# Patient Record
Sex: FEMALE | Race: WHITE | NOT HISPANIC OR LATINO | URBAN - METROPOLITAN AREA
[De-identification: names, ages, dates, MRNs, and addresses within clinical notes are randomized per-mention and may not be internally consistent; named-entity substitution may affect disease eponyms.]

---

## 2017-07-15 ENCOUNTER — HOSPITAL ENCOUNTER (EMERGENCY)
Facility: CLINIC | Age: 27
Discharge: HOME | End: 2017-07-15
Attending: EMERGENCY MEDICINE | Admitting: EMERGENCY MEDICINE
Payer: COMMERCIAL

## 2017-07-15 VITALS
TEMPERATURE: 98.6 F | HEART RATE: 78 BPM | OXYGEN SATURATION: 90 % | DIASTOLIC BLOOD PRESSURE: 78 MMHG | SYSTOLIC BLOOD PRESSURE: 110 MMHG | RESPIRATION RATE: 17 BRPM

## 2017-07-15 DIAGNOSIS — F10.930 ALCOHOL WITHDRAWAL SEIZURE, UNCOMPLICATED (H): ICD-10-CM

## 2017-07-15 DIAGNOSIS — F10.239 ALCOHOL DEPENDENCE WITH WITHDRAWAL WITH COMPLICATION (H): ICD-10-CM

## 2017-07-15 DIAGNOSIS — R79.89 ABNORMAL LFTS: ICD-10-CM

## 2017-07-15 DIAGNOSIS — R56.9 ALCOHOL WITHDRAWAL SEIZURE, UNCOMPLICATED (H): ICD-10-CM

## 2017-07-15 LAB
ALBUMIN SERPL-MCNC: 3.4 G/DL (ref 3.4–5)
ALP SERPL-CCNC: 143 U/L (ref 40–150)
ALT SERPL W P-5'-P-CCNC: 245 U/L (ref 0–50)
ANION GAP SERPL CALCULATED.3IONS-SCNC: 11 MMOL/L (ref 3–14)
AST SERPL W P-5'-P-CCNC: 398 U/L (ref 0–45)
BASOPHILS # BLD AUTO: 0 10E9/L (ref 0–0.2)
BASOPHILS NFR BLD AUTO: 0.9 %
BILIRUB SERPL-MCNC: 0.9 MG/DL (ref 0.2–1.3)
BUN SERPL-MCNC: 6 MG/DL (ref 7–30)
CALCIUM SERPL-MCNC: 8.9 MG/DL (ref 8.5–10.1)
CHLORIDE SERPL-SCNC: 101 MMOL/L (ref 94–109)
CO2 SERPL-SCNC: 26 MMOL/L (ref 20–32)
CREAT SERPL-MCNC: 0.47 MG/DL (ref 0.52–1.04)
DIFFERENTIAL METHOD BLD: ABNORMAL
EOSINOPHIL # BLD AUTO: 0 10E9/L (ref 0–0.7)
EOSINOPHIL NFR BLD AUTO: 0.9 %
ERYTHROCYTE [DISTWIDTH] IN BLOOD BY AUTOMATED COUNT: 13.6 % (ref 10–15)
ETHANOL SERPL-MCNC: 0.07 G/DL
GFR SERPL CREATININE-BSD FRML MDRD: ABNORMAL ML/MIN/1.7M2
GLUCOSE SERPL-MCNC: 89 MG/DL (ref 70–99)
HCG SERPL QL: NEGATIVE
HCT VFR BLD AUTO: 32.3 % (ref 35–47)
HGB BLD-MCNC: 10.9 G/DL (ref 11.7–15.7)
IMM GRANULOCYTES # BLD: 0 10E9/L (ref 0–0.4)
IMM GRANULOCYTES NFR BLD: 0.4 %
LYMPHOCYTES # BLD AUTO: 0.7 10E9/L (ref 0.8–5.3)
LYMPHOCYTES NFR BLD AUTO: 15.5 %
MCH RBC QN AUTO: 36.5 PG (ref 26.5–33)
MCHC RBC AUTO-ENTMCNC: 33.7 G/DL (ref 31.5–36.5)
MCV RBC AUTO: 108 FL (ref 78–100)
MONOCYTES # BLD AUTO: 0.4 10E9/L (ref 0–1.3)
MONOCYTES NFR BLD AUTO: 8.4 %
NEUTROPHILS # BLD AUTO: 3.3 10E9/L (ref 1.6–8.3)
NEUTROPHILS NFR BLD AUTO: 73.9 %
NRBC # BLD AUTO: 0 10*3/UL
NRBC BLD AUTO-RTO: 0 /100
PLATELET # BLD AUTO: 138 10E9/L (ref 150–450)
POTASSIUM SERPL-SCNC: 3.4 MMOL/L (ref 3.4–5.3)
PROT SERPL-MCNC: 7 G/DL (ref 6.8–8.8)
RBC # BLD AUTO: 2.99 10E12/L (ref 3.8–5.2)
SODIUM SERPL-SCNC: 138 MMOL/L (ref 133–144)
WBC # BLD AUTO: 4.5 10E9/L (ref 4–11)

## 2017-07-15 PROCEDURE — 80320 DRUG SCREEN QUANTALCOHOLS: CPT | Performed by: EMERGENCY MEDICINE

## 2017-07-15 PROCEDURE — 36415 COLL VENOUS BLD VENIPUNCTURE: CPT | Performed by: EMERGENCY MEDICINE

## 2017-07-15 PROCEDURE — 25000125 ZZHC RX 250: Performed by: EMERGENCY MEDICINE

## 2017-07-15 PROCEDURE — 99285 EMERGENCY DEPT VISIT HI MDM: CPT | Mod: 25

## 2017-07-15 PROCEDURE — 80053 COMPREHEN METABOLIC PANEL: CPT | Performed by: EMERGENCY MEDICINE

## 2017-07-15 PROCEDURE — 96375 TX/PRO/DX INJ NEW DRUG ADDON: CPT

## 2017-07-15 PROCEDURE — 96366 THER/PROPH/DIAG IV INF ADDON: CPT

## 2017-07-15 PROCEDURE — 96365 THER/PROPH/DIAG IV INF INIT: CPT

## 2017-07-15 PROCEDURE — 85025 COMPLETE CBC W/AUTO DIFF WBC: CPT | Performed by: EMERGENCY MEDICINE

## 2017-07-15 PROCEDURE — 25000128 H RX IP 250 OP 636: Performed by: EMERGENCY MEDICINE

## 2017-07-15 PROCEDURE — 84703 CHORIONIC GONADOTROPIN ASSAY: CPT | Performed by: EMERGENCY MEDICINE

## 2017-07-15 RX ORDER — DIAZEPAM 10 MG/2ML
10 INJECTION, SOLUTION INTRAMUSCULAR; INTRAVENOUS EVERY 4 HOURS PRN
Status: DISCONTINUED | OUTPATIENT
Start: 2017-07-15 | End: 2017-07-16 | Stop reason: HOSPADM

## 2017-07-15 RX ORDER — LIDOCAINE 40 MG/G
CREAM TOPICAL
Status: DISCONTINUED | OUTPATIENT
Start: 2017-07-15 | End: 2017-07-16 | Stop reason: HOSPADM

## 2017-07-15 RX ORDER — LORAZEPAM 1 MG/1
1 TABLET ORAL EVERY 6 HOURS PRN
Qty: 20 TABLET | Refills: 0 | Status: SHIPPED | OUTPATIENT
Start: 2017-07-15

## 2017-07-15 RX ADMIN — FOLIC ACID: 5 INJECTION, SOLUTION INTRAMUSCULAR; INTRAVENOUS; SUBCUTANEOUS at 18:18

## 2017-07-15 RX ADMIN — DIAZEPAM 5 MG: 5 INJECTION, SOLUTION INTRAMUSCULAR; INTRAVENOUS at 17:42

## 2017-07-15 ASSESSMENT — ENCOUNTER SYMPTOMS
VOMITING: 0
SEIZURES: 1
CONFUSION: 0
FREQUENCY: 0
NAUSEA: 0
HEADACHES: 0
TREMORS: 1
DYSURIA: 0
NERVOUS/ANXIOUS: 0
SHORTNESS OF BREATH: 0

## 2017-07-15 NOTE — ED PROVIDER NOTES
"  History     Chief Complaint:  Seizures    HPI:  Karma Nicholas is a 26 year old female with a history of alcohol abuse and withdrawal seizures who presents for evaluation after a seizure. The patient has a long-standing history of alcohol abuse and has been to treatment multiple times with no significant length of time of sobriety. Until today, she has been drinking a pint of vodka daily. Today, she had her last drink around noon when she decided she was going to quit \"cold turkey.\" This evening, she and her boyfriend, a fully recovered alcoholic, were out to dinner at a local [employer]. While they were sharing some french fries, her boyfriend saw the patient become tremulous. The patient then stood up and had a \"far away look in her eyes\" before she stiffened up and began to fall. Her boyfriend was there to catch her and gently lower her to the ground, stating that she never hit her head. He denies evidence of any grand mal or tonic clonic activity, but states that she stopped breathing for a period of about 15 seconds until she caught her breath again. While this was going on, another customer was there to call 911. Her seizure activity lasted approximately for one minute. Upon EMS arrival, she was alert and oriented with an alcohol breath level of 0.101. The patient denies any recent stress, trauma, chest pain, shortness of breath, nausea, vomiting, or urinary symptoms.     Allergies:  Shellfish     Medications:    The patient is not currently taking any prescribed medications.      Past Medical History:    Seizure  Substance abuse    Past Surgical History:    History reviewed. No pertinent surgical history.     Family History:    History reviewed. No pertinent family history.      Social History:  Alcohol use: Yes -- pint of vodka daily   Marital Status:  Single    Presents with significant other at bedside.     Review of Systems   Respiratory: Negative for shortness of breath.    Cardiovascular: Negative " for chest pain.   Gastrointestinal: Negative for nausea and vomiting.   Genitourinary: Negative for dysuria, frequency and urgency.   Neurological: Positive for tremors and seizures. Negative for headaches.   Psychiatric/Behavioral: Negative for confusion. The patient is not nervous/anxious.    All other systems reviewed and are negative.      Physical Exam     Patient Vitals for the past 24 hrs:   BP Temp Temp src Pulse Heart Rate Resp SpO2   07/15/17 1900 110/78 - - - - - -   07/15/17 1845 109/77 - - - - - -   07/15/17 1830 111/84 - - - - - 90 %   07/15/17 1815 - - - - - - 98 %   07/15/17 1800 - - - - - - 96 %   07/15/17 1715 110/75 98.6  F (37  C) Oral 78 78 17 100 %       Physical Exam:  Constitutional:  Appears well-developed and well-nourished. Alert. Conversant. Calm. Non toxic.  HENT:   Head: Atraumatic.   Nose: Nose normal.  Mouth/Throat: Oral mucosa is clear and moist. Not dry. no trismus. Pharynx normal. Tonsils symmetric. No tonsillar enlargement, erythema, or exudate.  Eyes: Conjunctivae normal. EOM normal. Pupils equal, round, and reactive to light. No scleral icterus.   Neck: Normal range of motion. Neck supple. No tracheal deviation present.   Cardiovascular: Normal rate, regular rhythm. No gallop. No friction rub. No murmur heard. Symmetric radial artery pulses   Pulmonary/Chest: Effort normal. No stridor. No respiratory distress. No wheezes. No rales. No rhonchi . No tenderness.   Abdominal: Soft. Bowel sounds normal. No distension. No mass. No HSM. No tenderness. No rebound. No guarding.   Musculoskeletal: Normal range of motion. No edema. No tenderness. No deformity   Lymph: No cervical adenopathy.   Neurological: Alert and oriented to person, place, and time. Normal strength. CN II-VII intact. No sensory deficit. GCS eye subscore is 4. GCS verbal subscore is 5. GCS motor subscore is 6. Normal coordination   Skin: Skin is warm and dry. No rash noted. No pallor. Normal capillary  refill.  Psychiatric:  Mild tremor. No asterixis. Normal affect. no slurred speech or signs of intoxication. Reports anxiousness prior to seizure. Now calmer. Long h/o alcohol abuse, has been trying to cut back on vodka since yesterday. Normally drinks 1 pint/day. Last drink was this morning. Occasional MJ. Rare meth, none lately. In supportive relationship with BF. Denies assault, abuse. No argument or stressor prior to seizure.    Emergency Department Course     Laboratory:  CBC: RBC 2.99 (L), HGB 10.9 (L), HCT 32.3 (L),  (H), MCH 36.5 (H),  (L), Absolute Lymphocytes 0.7 (L), o/w WNL (WBC 4.5)    CMP: BUN 6 (L), Creatinine 0.47 (L),  (H),  (H), o/w WNL  Ethanol level: 0.07 (H)     Interventions:   1742- Valium 10 mg IV  1818- Infuvite 10 mL IV    Emergency Department Course:  Past medical records, nursing notes, and vitals reviewed.  1720: I performed an exam of the patient and obtained history, as documented above.    IV inserted and blood drawn.     The patient was placed on continuous cardiac monitoring and pulse oximetry.     The above interventions were administered.     2007: I rechecked the patient. Laboratory findings and plan explained to the Patient and significant other. Patient discharged home with instructions regarding supportive care, medications, and reasons to return. The importance of close follow-up was reviewed.       Impression & Plan      Medical Decision Making:  Karma Nicholas is a 26 year old female who was brought to the ED via EMS after a probable alcohol withdrawal seizure. Historians confirm a brief period generalized tonic clonic activity, followed by a brief post ictal phase. She has now returned to her baseline neurological status on her ED arrival. She states that she has recently been trying to cut down on her baseline alcohol use. She actually still has some alcohol on board, but is clinically sober. Her history, as well as the clinical context, does  suggest a period of heavy alcohol use likely triggering an alcohol withdrawal seizure. Here in the ED, she is back to normal. She was administered a low dose of Valium and her mild tremors at presentation have now improved. Nonetheless, I think that she is at high risk for recurrent alcohol withdrawal seizures with progression to severe, full-blown alcohol withdrawal, potentially even DT's. I recommended hospitalization for ongoing monitoring and PRN benzodiazepines. The patient adamantly refuses to stay. She is now clinically sober, not slurring her speech, has an alcohol level below the legal limit to drive, and she is not displaying alcohol withdrawal symptoms at this time. She has had no alteration in sensorium. I believe she has medical decision making capacity, therefore, I will discharge her home against medical advice.     She will be given a prescription for Ativan to try and manage alcohol withdrawal at home.    The patient is also declining evaluation by [facility] for mental health and substance abuse resources. She would not like any treatment at this time. Her laboratory work up shows abnormal LFT's, but reassuring electrolytes. She has a pattern consistent with alcoholic hepatitis and I recommended following up with her primary doctor for recheck of that.     Diagnosis:    ICD-10-CM   1. Alcohol withdrawal seizure, uncomplicated (H) F10.230   2. Alcohol dependence with withdrawal with complication (H) F10.239   3. Abnormal LFTs R79.89     Disposition:  Discharged to home with Ativan.     Discharge Medications:  New Prescriptions    LORAZEPAM (ATIVAN) 1 MG TABLET    Take 1 tablet (1 mg) by mouth every 6 hours as needed for anxiety or nausea (tremors, or other alcohol withdrawal symptoms)     Ashley Yeh  7/15/2017   [facility] EMERGENCY DEPARTMENT    I, Ashley Yeh, am serving as a scribe at 5:20 PM on 7/15/2017 to document services personally performed by Jann Matos MD based on  my observations and the provider's statements to me.         Jann Matos MD  07/16/17 0142

## 2017-07-15 NOTE — ED AVS SNAPSHOT
[facility] Emergency Department    [address]    [facility] [address]    Phone:  [phone]    Fax:  [phone]                                       Karma Nicholas   MRN: [id number]    Department:  [facility] Emergency Department   Date of Visit:  7/15/2017           Patient Information     Date Of Birth          1990        Your diagnoses for this visit were:     Alcohol withdrawal seizure, uncomplicated (H)     Alcohol dependence with withdrawal with complication (H)     Abnormal LFTs        You were seen by Jann aMtos MD.      Follow-up Information     Please follow up.    Why:  Please recheck with your doctor on Monday to recheck your liver. RETURN TO THE ER RIGHT AWAY IF ANY PROBLEMS- especially seizure, tremor, vomiting, confusion      Discharge References/Attachments     ALCOHOL WITHDRAWAL (ENGLISH)    ALCOHOL WITHDRAWAL SEIZURE (ENGLISH)      24 Hour Appointment Hotline       To make an appointment at any [place] clinic, call [phone] ([phone]). If you don't have a family doctor or clinic, we will help you find one. [facility] [place] clinics are conveniently located to serve the needs of you and your family.             Review of your medicines      START taking        Dose / Directions Last dose taken    LORazepam 1 MG tablet   Commonly known as:  ATIVAN   Dose:  1 mg   Quantity:  20 tablet        Take 1 tablet (1 mg) by mouth every 6 hours as needed for anxiety or nausea (tremors, or other alcohol withdrawal symptoms)   Refills:  0                Prescriptions were sent or printed at these locations (1 Prescription)                   Other Prescriptions                Printed at Department/Unit printer (1 of 1)         LORazepam (ATIVAN) 1 MG tablet                Procedures and tests performed during your visit     CBC with platelets differential    Comprehensive metabolic panel    Ethanol level    HCG qualitative      Orders Needing  Specimen Collection     None      Pending Results     No orders found from 7/13/2017 to 7/16/2017.            Pending Culture Results     No orders found from 7/13/2017 to 7/16/2017.            Pending Results Instructions     If you had any lab results that were not finalized at the time of your Discharge, you can call the ED Lab Result RN at [phone]. You will be contacted by this team for any positive Lab results or changes in treatment. The nurses are available 7 days a week from 10A to 6:30P.  You can leave a message 24 hours per day and they will return your call.        Test Results From Your Hospital Stay        7/15/2017  6:42 PM      Component Results     Component Value Ref Range & Units Status    WBC 4.5 4.0 - 11.0 10e9/L Final    RBC Count 2.99 (L) 3.8 - 5.2 10e12/L Final    Hemoglobin 10.9 (L) 11.7 - 15.7 g/dL Final    Hematocrit 32.3 (L) 35.0 - 47.0 % Final     (H) 78 - 100 fl Final    MCH 36.5 (H) 26.5 - 33.0 pg Final    MCHC 33.7 31.5 - 36.5 g/dL Final    RDW 13.6 10.0 - 15.0 % Final    Platelet Count 138 (L) 150 - 450 10e9/L Final    Diff Method Automated Method  Final    % Neutrophils 73.9 % Final    % Lymphocytes 15.5 % Final    % Monocytes 8.4 % Final    % Eosinophils 0.9 % Final    % Basophils 0.9 % Final    % Immature Granulocytes 0.4 % Final    Nucleated RBCs 0 0 /100 Final    Absolute Neutrophil 3.3 1.6 - 8.3 10e9/L Final    Absolute Lymphocytes 0.7 (L) 0.8 - 5.3 10e9/L Final    Absolute Monocytes 0.4 0.0 - 1.3 10e9/L Final    Absolute Eosinophils 0.0 0.0 - 0.7 10e9/L Final    Absolute Basophils 0.0 0.0 - 0.2 10e9/L Final    Abs Immature Granulocytes 0.0 0 - 0.4 10e9/L Final    Absolute Nucleated RBC 0.0  Final         7/15/2017  7:00 PM      Component Results     Component Value Ref Range & Units Status    Sodium 138 133 - 144 mmol/L Final    Potassium 3.4 3.4 - 5.3 mmol/L Final    Chloride 101 94 - 109 mmol/L Final    Carbon Dioxide 26 20 - 32 mmol/L Final    Anion Gap 11 3 - 14  mmol/L Final    Glucose 89 70 - 99 mg/dL Final    Urea Nitrogen 6 (L) 7 - 30 mg/dL Final    Creatinine 0.47 (L) 0.52 - 1.04 mg/dL Final    GFR Estimate >90  Non  GFR Calc   >60 mL/min/1.7m2 Final    GFR Estimate If Black >90   GFR Calc   >60 mL/min/1.7m2 Final    Calcium 8.9 8.5 - 10.1 mg/dL Final    Bilirubin Total 0.9 0.2 - 1.3 mg/dL Final    Albumin 3.4 3.4 - 5.0 g/dL Final    Protein Total 7.0 6.8 - 8.8 g/dL Final    Alkaline Phosphatase 143 40 - 150 U/L Final     (H) 0 - 50 U/L Final     (H) 0 - 45 U/L Final         7/15/2017  7:00 PM      Component Results     Component Value Ref Range & Units Status    Ethanol g/dL 0.07 (H) <0.01 g/dL Final         7/15/2017  7:50 PM      Component Results     Component Value Ref Range & Units Status    HCG Qualitative Serum Negative NEG Final                Clinical Quality Measure: Blood Pressure Screening     Your blood pressure was checked while you were in the emergency department today. The last reading we obtained was  BP: 110/78 . Please read the guidelines below about what these numbers mean and what you should do about them.  If your systolic blood pressure (the top number) is less than 120 and your diastolic blood pressure (the bottom number) is less than 80, then your blood pressure is normal. There is nothing more that you need to do about it.  If your systolic blood pressure (the top number) is 120-139 or your diastolic blood pressure (the bottom number) is 80-89, your blood pressure may be higher than it should be. You should have your blood pressure rechecked within a year by a primary care provider.  If your systolic blood pressure (the top number) is 140 or greater or your diastolic blood pressure (the bottom number) is 90 or greater, you may have high blood pressure. High blood pressure is treatable, but if left untreated over time it can put you at risk for heart attack, stroke, or kidney failure. You should  "have your blood pressure rechecked by a primary care provider within the next 4 weeks.  If your provider in the emergency department today gave you specific instructions to follow-up with your doctor or provider even sooner than that, you should follow that instruction and not wait for up to 4 weeks for your follow-up visit.        Thank you for choosing [facility]       Thank you for choosing [facility] for your care. Our goal is always to provide you with excellent care. Hearing back from our patients is one way we can continue to improve our services. Please take a few minutes to complete the written survey that you may receive in the mail after you visit with us. Thank you!        [email or web address] Information     [employer] lets you send messages to your doctor, view your test results, renew your prescriptions, schedule appointments and more. To sign up, go to [email or web address] . Click on \"Log in\" on the left side of the screen, which will take you to the Welcome page. Then click on \"Sign up Now\" on the right side of the page.     You will be asked to enter the access code listed below, as well as some personal information. Please follow the directions to create your username and password.     Your access code is: [email or web address]  Expires: 10/13/2017  8:12 PM     Your access code will  in 90 days. If you need help or a new code, please call your [facility] clinic or [phone].        Care EveryWhere ID     This is your Care EveryWhere ID. This could be used by other organizations to access your [facility] medical records  [id number]        Equal Access to Services     [facility] AUGUSTINE : Hadii aad ku hadasho Somaximusali, waaxda luqadaha, qaybta kaalmada adeegyada, mark bell. So [facility] [phone].    ATENCIÓN: Si habla español, tiene a santos disposición servicios gratuitos de asistencia lingüística. Llame al [phone].    We comply with applicable federal civil rights laws and [place] laws. We " do not discriminate on the basis of race, color, national origin, age, disability sex, sexual orientation or gender identity.            After Visit Summary       This is your record. Keep this with you and show to your community pharmacist(s) and doctor(s) at your next visit.

## 2017-07-15 NOTE — ED AVS SNAPSHOT
[facility] Emergency Department    [address]    [facility] [address]    Phone:  [phone]    Fax:  [phone]                                       Karma Nicholas   MRN: [id number]    Department:  [facility] Emergency Department   Date of Visit:  7/15/2017           After Visit Summary Signature Page     I have received my discharge instructions, and my questions have been answered. I have discussed any challenges I see with this plan with the nurse or doctor.    ..........................................................................................................................................  Patient/Patient Representative Signature      ..........................................................................................................................................  Patient Representative Print Name and Relationship to Patient    ..................................................               ................................................  Date                                            Time    ..........................................................................................................................................  Reviewed by Signature/Title    ...................................................              ..............................................  Date                                                            Time

## 2017-07-15 NOTE — ED NOTES
pt comes in after seizure at [facility] lasting 1-2 min. pt attempting to stop drinking cold turkey. pt hx of dt and seizure with etoh withdrawel. pt bal .101 vss pt drinking for 15 years andn has been to treatment multiple times. pt got 500cc NS LEFT FA IV

## 2017-08-01 ENCOUNTER — RECORDS - HEALTHEAST (OUTPATIENT)
Dept: ADMINISTRATIVE | Facility: OTHER | Age: 27
End: 2017-08-01

## 2017-09-14 ENCOUNTER — COMMUNICATION - HEALTHEAST (OUTPATIENT)
Dept: INTERNAL MEDICINE | Facility: CLINIC | Age: 27
End: 2017-09-14

## 2017-09-15 ENCOUNTER — OFFICE VISIT - HEALTHEAST (OUTPATIENT)
Dept: INTERNAL MEDICINE | Facility: CLINIC | Age: 27
End: 2017-09-15

## 2017-09-15 DIAGNOSIS — F10.939 ALCOHOL WITHDRAWAL (H): ICD-10-CM

## 2017-09-15 DIAGNOSIS — Z23 ENCOUNTER FOR VACCINATION: ICD-10-CM

## 2017-09-15 DIAGNOSIS — F41.9 ANXIETY: ICD-10-CM

## 2017-09-15 DIAGNOSIS — Z09 HOSPITAL DISCHARGE FOLLOW-UP: ICD-10-CM

## 2017-09-15 DIAGNOSIS — K85.20 ACUTE ALCOHOLIC PANCREATITIS: ICD-10-CM

## 2017-09-28 ENCOUNTER — COMMUNICATION - HEALTHEAST (OUTPATIENT)
Dept: INTERNAL MEDICINE | Facility: CLINIC | Age: 27
End: 2017-09-28

## 2017-10-11 ENCOUNTER — OFFICE VISIT - HEALTHEAST (OUTPATIENT)
Dept: INTERNAL MEDICINE | Facility: CLINIC | Age: 27
End: 2017-10-11

## 2017-10-11 DIAGNOSIS — F10.10 ALCOHOL ABUSE: ICD-10-CM

## 2017-10-11 DIAGNOSIS — F41.9 ANXIETY: ICD-10-CM

## 2017-10-11 DIAGNOSIS — F31.9 BIPOLAR 1 DISORDER (H): ICD-10-CM

## 2017-10-11 DIAGNOSIS — F10.20 UNCOMPLICATED ALCOHOL DEPENDENCE (H): ICD-10-CM

## 2017-10-11 DIAGNOSIS — F90.0 ATTENTION DEFICIT HYPERACTIVITY DISORDER (ADHD), PREDOMINANTLY INATTENTIVE TYPE: ICD-10-CM

## 2017-10-13 ENCOUNTER — COMMUNICATION - HEALTHEAST (OUTPATIENT)
Dept: INTERNAL MEDICINE | Facility: CLINIC | Age: 27
End: 2017-10-13

## 2017-11-21 ENCOUNTER — COMMUNICATION - HEALTHEAST (OUTPATIENT)
Dept: INTERNAL MEDICINE | Facility: CLINIC | Age: 27
End: 2017-11-21

## 2018-04-05 ENCOUNTER — COMMUNICATION - HEALTHEAST (OUTPATIENT)
Dept: INTERNAL MEDICINE | Facility: CLINIC | Age: 28
End: 2018-04-05

## 2018-04-05 DIAGNOSIS — F41.9 ANXIETY: ICD-10-CM

## 2019-06-28 ENCOUNTER — AMBULATORY - HEALTHEAST (OUTPATIENT)
Dept: OTHER | Facility: CLINIC | Age: 29
End: 2019-06-28

## 2019-06-28 ENCOUNTER — DOCUMENTATION ONLY (OUTPATIENT)
Dept: OTHER | Facility: CLINIC | Age: 29
End: 2019-06-28

## 2019-08-13 ENCOUNTER — AMBULATORY - HEALTHEAST (OUTPATIENT)
Dept: NEUROSURGERY | Facility: CLINIC | Age: 29
End: 2019-08-13

## 2019-08-13 DIAGNOSIS — R58 HEMORRHAGE: ICD-10-CM

## 2019-08-26 ENCOUNTER — OFFICE VISIT - HEALTHEAST (OUTPATIENT)
Dept: NEUROSURGERY | Facility: CLINIC | Age: 29
End: 2019-08-26

## 2019-08-26 ENCOUNTER — AMBULATORY - HEALTHEAST (OUTPATIENT)
Dept: LAB | Facility: HOSPITAL | Age: 29
End: 2019-08-26

## 2019-08-26 ENCOUNTER — HOSPITAL ENCOUNTER (OUTPATIENT)
Dept: CT IMAGING | Facility: HOSPITAL | Age: 29
Discharge: HOME | End: 2019-08-26
Attending: NEUROLOGICAL SURGERY

## 2019-08-26 DIAGNOSIS — D69.6 THROMBOCYTOPENIA (H): ICD-10-CM

## 2019-08-26 DIAGNOSIS — R58 HEMORRHAGE: ICD-10-CM

## 2019-08-26 DIAGNOSIS — F10.930 ALCOHOL WITHDRAWAL SYNDROME WITHOUT COMPLICATION (H): ICD-10-CM

## 2019-08-26 DIAGNOSIS — K21.00 GASTROESOPHAGEAL REFLUX DISEASE WITH ESOPHAGITIS: ICD-10-CM

## 2019-08-26 LAB
ERYTHROCYTE [DISTWIDTH] IN BLOOD BY AUTOMATED COUNT: 14.4 % (ref 11–14.5)
HCT VFR BLD AUTO: 35.3 % (ref 35–47)
HGB BLD-MCNC: 11.5 G/DL (ref 12–16)
MCH RBC QN AUTO: 35.5 PG (ref 27–34)
MCHC RBC AUTO-ENTMCNC: 32.6 G/DL (ref 32–36)
MCV RBC AUTO: 109 FL (ref 80–100)
PLATELET # BLD AUTO: 383 THOU/UL (ref 140–440)
PMV BLD AUTO: 9.9 FL (ref 8.5–12.5)
RBC # BLD AUTO: 3.24 MILL/UL (ref 3.8–5.4)
WBC: 7.9 THOU/UL (ref 4–11)

## 2019-08-26 ASSESSMENT — MIFFLIN-ST. JEOR: SCORE: 1281.53

## 2020-02-26 ENCOUNTER — COMMUNICATION - HEALTHEAST (OUTPATIENT)
Dept: NEUROSURGERY | Facility: CLINIC | Age: 30
End: 2020-02-26

## 2021-05-26 ENCOUNTER — RECORDS - HEALTHEAST (OUTPATIENT)
Dept: ADMINISTRATIVE | Facility: CLINIC | Age: 31
End: 2021-05-26

## 2021-05-27 ENCOUNTER — RECORDS - HEALTHEAST (OUTPATIENT)
Dept: ADMINISTRATIVE | Facility: CLINIC | Age: 31
End: 2021-05-27

## 2021-05-31 VITALS — WEIGHT: 130.2 LBS | BODY MASS INDEX: 23.06 KG/M2

## 2021-05-31 VITALS — WEIGHT: 135.8 LBS | BODY MASS INDEX: 24.06 KG/M2

## 2021-05-31 NOTE — PATIENT INSTRUCTIONS - HE
Continue to avoid aspirin, nsaids, blood thinners. I will send you for labs to evaluate your platelet function and platelet count. We will see you again in one month after an MRI. Would strongly encourage you to keep avoiding alcohol.  I recommend you see your PCP to establish and get refills for your meds.

## 2021-05-31 NOTE — PROGRESS NOTES
CHART NOTE     DATE OF SERVICE:  2019     : 1990   29 y.o.     ASSESSMENT :   Recovering well s/p right pareital IPH, no further issues. She reports she has only had alcohol once since discharge. Her grandma is present and also reports she has been doing well. CT with expected evolution of the hemorrhage, decreased amount of cerebral edema.      PLAN:   1. Continue to avoid alcohol, aspirin, nsaids  2. Go to [facility] to check your platelet count  3. Repeat MRI head with and without contrast in one month with appt to follow  4. See your PCP for refills on your psych meds  5. Needs follow up with neurology per Dr. Hannah's note. I will send referral.       HPI:  Karma Nicholas is status post hospital consult after she had a witnessed seizure at home. PMH significant for alcoholism, liver cirrhosis, thrombocytopenia. On imaging she had multifocal cerebral lesions most concerning for IPH, however cerebral abscess or underlying lesion was in the differential. She was stable to improved throughout her hospital course, no s/sx of infection. She had an EEG that was consistent with seizure activity. MRA/MRV was negative. Repeat imaging was stable and she was discharged home.      TODAY, Karma Nicholas reports she has been doing well at home. She has had less panic attacks than prior to admission. No seizures, no falls, she reports minimal alcohol use. Her grandma reports she is doing quite well. Karma reports that she was having trouble with proprioception at discharge and that has improved, she reports minimal headaches.      PAST MEDICAL HISTORY, SURGICAL HISTORY, REVIEW OF SYMPTOMS, MEDICATIONS AND ALLERGIES:  Past medical history, surgical history, ROS, medications and allergies reviewed with patient and remain unchanged from previous visit.    Past Medical History:   Diagnosis Date     Alcohol abuse     Created by Conversion  Replacement Utility updated for latest IMO load     Anxiety      Asthma  "    Created by Conversion      Bipolar 1 disorder (H) 10/28/2017     Eating disorder 8/10/2012     Manic-depressive (H)      Panic attacks        PHYSICAL EXAM:    BP (!) 86/55   Pulse 64   Ht 5' 4\" (1.626 m)   Wt 126 lb (57.2 kg)   SpO2 99%   BMI 21.63 kg/m      Neurological exam reveals:  Respirations easy, non-labored.   Skin: W/D/I. No rashes, lesions or breaks in integrity.   Recent and remote memory intact, fund of knowledge wnl.    Alert and oriented x3, speech fluent and appropriate.   Comprehension intact with 2 step crossover command.   Finger nose finger smooth and accurate  PERRL, EOMI, No nystagmus,   Face symmetric, tongue midline, Uvula midline,  palate rises with phonation   Shoulder shrug equal  Arm strength bilateral grasp, biceps, triceps, and deltoids 5/5   Leg strength bilateral dorsiflexion, plantar flexion, and hip flexion 5/5  No extremity edema noted.   Can heel/toe walk, do toe rises/heel raises without difficulty  Muscle Bulk and tone wnl.   Gait and station:Normal       RADIOGRAPHIC IMAGING: Head CT films personally reviewed and interpreted.  Also reviewed and discussed with Dr. Bass.   Reviewed imaging with patient and family.      EXAM: CT HEAD WO CONTRAST  LOCATION: [facility]  DATE/TIME: 8/26/2019 2:07 PM     INDICATION: Seizure, abnormal neuro exam.  COMPARISON: 8/12/2019 brain MRI. 8/11/2019 head CT.  TECHNIQUE: Routine without IV contrast. Multiplanar reformats. Dose reduction techniques were used.     FINDINGS:  The previously described intraparenchymal hematoma in the medial right parietal lobe has undergone continued temporal evolution, now with an iso to hypointense overall appearance. Overall size is similar to previous, measuring 2.5 x 1.6 x 2.5 cm in AP,   transverse, and craniocaudal dimensions respectively. Adjacent hypoattenuating vasogenic edema has diminished in the interim, now with only a small amount involving the right parietal lobe. No additional foci " of intracranial hemorrhage elsewhere.   Otherwise unremarkable appearance to the brain parenchyma elsewhere. Normal ventricular size. No midline shift. Calvarium intact. Remainder unchanged.     IMPRESSION:   1.  Previously described intraparenchymal hematoma in the medial right parietal lobe is stable in size and has undergone an expected temporal evolution, now with an isointense to hypointense overall appearance suggesting a subacute to chronic nature.   Small amount of adjacent hypoattenuating vasogenic edema has decreased in the interim. As previously noted, a contrast-enhanced brain MRI after resolution of this intraparenchymal hematoma could be considered to exclude an underlying abnormal mass.

## 2021-06-03 VITALS — WEIGHT: 126 LBS | HEIGHT: 64 IN | BODY MASS INDEX: 21.51 KG/M2

## 2021-06-05 ENCOUNTER — HEALTH MAINTENANCE LETTER (OUTPATIENT)
Age: 31
End: 2021-06-05

## 2021-06-06 NOTE — TELEPHONE ENCOUNTER
Referral to Neurology was put in on 08.26.19. Per comments in the order, it was faxed to [facility] on 08.27.19. I called [facility] to see if patient was seen and they said no. I then called patient to help her set up Neurology visit and patient said she will call them to schedule.

## 2021-06-16 PROBLEM — R56.9 SEIZURES (H): Status: ACTIVE | Noted: 2019-08-16

## 2021-06-16 PROBLEM — F10.939 SEIZURE DUE TO ALCOHOL WITHDRAWAL (H): Status: ACTIVE | Noted: 2017-01-22

## 2021-06-16 PROBLEM — F31.75 BIPOLAR DISORDER, IN PARTIAL REMISSION, MOST RECENT EPISODE DEPRESSED (H): Status: ACTIVE | Noted: 2017-10-06
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
## 2021-06-16 PROBLEM — K59.1 FUNCTIONAL DIARRHEA: Status: ACTIVE | Noted: 2018-07-30

## 2021-06-16 PROBLEM — F31.9 BIPOLAR 1 DISORDER (H): Status: ACTIVE | Noted: 2017-10-28

## 2021-06-16 PROBLEM — E87.6 HYPOKALEMIA: Status: ACTIVE | Noted: 2017-09-11

## 2021-06-16 PROBLEM — F10.930 ALCOHOL WITHDRAWAL, UNCOMPLICATED (H): Status: ACTIVE | Noted: 2019-02-27

## 2021-06-16 PROBLEM — K85.90 ACUTE PANCREATITIS: Status: ACTIVE | Noted: 2018-03-21

## 2021-06-16 PROBLEM — R79.89 ELEVATED LFTS: Status: ACTIVE | Noted: 2018-07-30

## 2021-06-16 PROBLEM — G47.00 INSOMNIA: Status: ACTIVE | Noted: 2019-08-16

## 2021-06-16 PROBLEM — F10.939 ALCOHOL WITHDRAWAL SYNDROME, WITH UNSPECIFIED COMPLICATION (H): Status: ACTIVE | Noted: 2019-03-20

## 2021-06-16 PROBLEM — R10.11 RUQ ABDOMINAL PAIN: Status: ACTIVE | Noted: 2018-07-30

## 2021-06-16 PROBLEM — K85.20 ACUTE ALCOHOLIC PANCREATITIS: Status: ACTIVE | Noted: 2017-09-09

## 2021-06-16 PROBLEM — K75.9 HEPATITIS: Status: ACTIVE | Noted: 2017-01-23

## 2021-06-16 PROBLEM — D64.9 ANEMIA: Status: ACTIVE | Noted: 2017-10-08

## 2021-06-16 PROBLEM — I61.0 NONTRAUMATIC SUBCORTICAL HEMORRHAGE OF RIGHT CEREBRAL HEMISPHERE (H): Status: ACTIVE | Noted: 2019-08-11

## 2021-06-16 PROBLEM — F10.239 ALCOHOL DEPENDENCE WITH WITHDRAWAL (H): Status: ACTIVE | Noted: 2019-06-27

## 2021-06-16 PROBLEM — R56.9 SEIZURE DUE TO ALCOHOL WITHDRAWAL (H): Status: ACTIVE | Noted: 2017-01-22

## 2021-06-16 PROBLEM — E83.42 HYPOMAGNESEMIA: Status: ACTIVE | Noted: 2017-09-11

## 2021-06-16 PROBLEM — F10.939 ALCOHOL WITHDRAWAL (H): Status: ACTIVE | Noted: 2018-07-30

## 2021-06-16 PROBLEM — K70.10 ALCOHOLIC HEPATITIS WITHOUT ASCITES (H): Status: ACTIVE | Noted: 2019-02-27

## 2021-06-16 PROBLEM — K35.80 ACUTE APPENDICITIS: Status: ACTIVE | Noted: 2017-09-09

## 2021-06-16 PROBLEM — S00.83XA CONTUSION OF FACE, INITIAL ENCOUNTER: Status: ACTIVE | Noted: 2019-03-06

## 2021-06-16 PROBLEM — F41.1 ANXIETY STATE: Status: ACTIVE | Noted: 2017-10-28
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
## 2021-09-25 ENCOUNTER — HEALTH MAINTENANCE LETTER (OUTPATIENT)
Age: 31
End: 2021-09-25

## 2022-07-02 ENCOUNTER — HEALTH MAINTENANCE LETTER (OUTPATIENT)
Age: 32
End: 2022-07-02

## 2023-01-07 ENCOUNTER — HEALTH MAINTENANCE LETTER (OUTPATIENT)
Age: 33
End: 2023-01-07

## 2023-07-15 ENCOUNTER — HEALTH MAINTENANCE LETTER (OUTPATIENT)
Age: 33
End: 2023-07-15